# Patient Record
Sex: MALE | Race: WHITE
[De-identification: names, ages, dates, MRNs, and addresses within clinical notes are randomized per-mention and may not be internally consistent; named-entity substitution may affect disease eponyms.]

---

## 2020-10-24 ENCOUNTER — HOSPITAL ENCOUNTER (EMERGENCY)
Dept: HOSPITAL 62 - ER | Age: 50
Discharge: HOME | End: 2020-10-24
Payer: OTHER GOVERNMENT

## 2020-10-24 VITALS — DIASTOLIC BLOOD PRESSURE: 77 MMHG | SYSTOLIC BLOOD PRESSURE: 136 MMHG

## 2020-10-24 DIAGNOSIS — F17.200: ICD-10-CM

## 2020-10-24 DIAGNOSIS — R07.9: Primary | ICD-10-CM

## 2020-10-24 DIAGNOSIS — D72.829: ICD-10-CM

## 2020-10-24 DIAGNOSIS — R05: ICD-10-CM

## 2020-10-24 LAB
ADD MANUAL DIFF: NO
ALBUMIN SERPL-MCNC: 4.4 G/DL (ref 3.5–5)
ALP SERPL-CCNC: 78 U/L (ref 38–126)
ANION GAP SERPL CALC-SCNC: 10 MMOL/L (ref 5–19)
AST SERPL-CCNC: 24 U/L (ref 17–59)
BASOPHILS # BLD AUTO: 0.1 10^3/UL (ref 0–0.2)
BASOPHILS NFR BLD AUTO: 0.7 % (ref 0–2)
BILIRUB DIRECT SERPL-MCNC: 0.3 MG/DL (ref 0–0.4)
BILIRUB SERPL-MCNC: 0.5 MG/DL (ref 0.2–1.3)
BUN SERPL-MCNC: 12 MG/DL (ref 7–20)
CALCIUM: 10 MG/DL (ref 8.4–10.2)
CHLORIDE SERPL-SCNC: 102 MMOL/L (ref 98–107)
CO2 SERPL-SCNC: 28 MMOL/L (ref 22–30)
EOSINOPHIL # BLD AUTO: 0.2 10^3/UL (ref 0–0.6)
EOSINOPHIL NFR BLD AUTO: 1.7 % (ref 0–6)
ERYTHROCYTE [DISTWIDTH] IN BLOOD BY AUTOMATED COUNT: 13 % (ref 11.5–14)
GLUCOSE SERPL-MCNC: 125 MG/DL (ref 75–110)
HCT VFR BLD CALC: 45.2 % (ref 37.9–51)
HGB BLD-MCNC: 15.5 G/DL (ref 13.5–17)
LYMPHOCYTES # BLD AUTO: 2.6 10^3/UL (ref 0.5–4.7)
LYMPHOCYTES NFR BLD AUTO: 20.7 % (ref 13–45)
MCH RBC QN AUTO: 32.7 PG (ref 27–33.4)
MCHC RBC AUTO-ENTMCNC: 34.3 G/DL (ref 32–36)
MCV RBC AUTO: 95 FL (ref 80–97)
MONOCYTES # BLD AUTO: 0.7 10^3/UL (ref 0.1–1.4)
MONOCYTES NFR BLD AUTO: 5.7 % (ref 3–13)
NEUTROPHILS # BLD AUTO: 8.9 10^3/UL (ref 1.7–8.2)
NEUTS SEG NFR BLD AUTO: 71.2 % (ref 42–78)
NT PRO BNP: 13 PG/ML (ref ?–125)
PLATELET # BLD: 408 10^3/UL (ref 150–450)
POTASSIUM SERPL-SCNC: 4.4 MMOL/L (ref 3.6–5)
PROT SERPL-MCNC: 7.1 G/DL (ref 6.3–8.2)
RBC # BLD AUTO: 4.75 10^6/UL (ref 4.35–5.55)
TOTAL CELLS COUNTED % (AUTO): 100 %
TROPONIN I SERPL-MCNC: < 0.012 NG/ML
WBC # BLD AUTO: 12.5 10^3/UL (ref 4–10.5)

## 2020-10-24 PROCEDURE — 93005 ELECTROCARDIOGRAM TRACING: CPT

## 2020-10-24 PROCEDURE — 71046 X-RAY EXAM CHEST 2 VIEWS: CPT

## 2020-10-24 PROCEDURE — 85025 COMPLETE CBC W/AUTO DIFF WBC: CPT

## 2020-10-24 PROCEDURE — 99285 EMERGENCY DEPT VISIT HI MDM: CPT

## 2020-10-24 PROCEDURE — 84484 ASSAY OF TROPONIN QUANT: CPT

## 2020-10-24 PROCEDURE — 93010 ELECTROCARDIOGRAM REPORT: CPT

## 2020-10-24 PROCEDURE — 36415 COLL VENOUS BLD VENIPUNCTURE: CPT

## 2020-10-24 PROCEDURE — 85379 FIBRIN DEGRADATION QUANT: CPT

## 2020-10-24 PROCEDURE — 83735 ASSAY OF MAGNESIUM: CPT

## 2020-10-24 PROCEDURE — 80053 COMPREHEN METABOLIC PANEL: CPT

## 2020-10-24 PROCEDURE — 83880 ASSAY OF NATRIURETIC PEPTIDE: CPT

## 2020-10-24 NOTE — RADIOLOGY REPORT (SQ)
EXAM DESCRIPTION:  CHEST 2 VIEWS



IMAGES COMPLETED DATE/TIME:  10/24/2020 5:06 pm



REASON FOR STUDY:  Chest pain radiates to back



COMPARISON:  None.



TECHNIQUE:  Frontal and lateral radiographic views of the chest acquired.



NUMBER OF VIEWS:  Two view.



LIMITATIONS:  None.



FINDINGS:  LUNGS AND PLEURA: No opacities, masses or pneumothorax. No pleural effusion.

MEDIASTINUM AND HILAR STRUCTURES: No masses or contour abnormalities.

HEART AND VASCULAR STRUCTURES: Heart normal size.  No evidence for failure.

BONES: No acute findings.

HARDWARE: None in the chest.

OTHER: No other significant finding.



IMPRESSION:  NO SIGNIFICANT RADIOGRAPHIC FINDING IN THE CHEST.



TECHNICAL DOCUMENTATION:  JOB ID:  [id number]

 2011 [employer]- All Rights Reserved



Reading location - IP/workstation name: [email or web address]

## 2020-10-24 NOTE — ER DOCUMENT REPORT
ED General





- General


Chief Complaint: Chest Pain


Stated Complaint: CHEST PAIN


Time Seen by Provider: 10/24/20 16:52


Primary Care Provider: 


MCKINLEY ZHENG PA [NO LOCAL MD] - Follow up in 1 week


KENAN ALEMAN MD [ACTIVE PROVISIONAL STAFF] - Follow up in 3-5 days


Mode of Arrival: Ambulatory


Notes: 





49-year-old male history of smoking presents with approximately 8 hours 

intermittent mild to moderate chest discomfort that feels like sharp pain behind

sternum without radiation, associated symptoms, prior episodes, or aggravating 

or alleviating factors.  Pain started while patient was at rest, comes for few 

minutes and goes away and happens several times throughout the day.  Pain not 

exacerbated by exertion, not exacerbated by deep breathing.  Patient denies any 

prior episodes, trauma, shortness of breath, change in chronic dry cough times 

years attributed to smoking, recent illness, fever, hemoptysis, recent 

travel/trauma/surgery/immobilization, DVT/PE/hypercoagulability history in self 

or family, cancer history, unexplained weight loss, lower extremity edema or 

pain, cardiac history, other cardiac risk factors other than smoking, alcohol 

abuse, drug use


TRAVEL OUTSIDE OF THE [place] IN LAST 30 DAYS: No





- Related Data


Allergies/Adverse Reactions: 


                                        





No Known Allergies Allergy (Verified 10/24/20 16:56)


   











Past Medical History





- General


Information source: Patient





- Social History


Smoking Status: Current Every Day Smoker


Chew tobacco use (# tins/day): No


Frequency of alcohol use: Social


Drug Abuse: None


Family History: Reviewed & Not Pertinent


Patient has homicidal ideation: No





- Past Medical History


Cardiac Medical History: 


   Denies: Hx Coronary Artery Disease, Hx Heart Attack, Hx Hypertension


Pulmonary Medical History: 


   Denies: Hx Asthma, Hx Bronchitis, Hx COPD, Hx Pneumonia


Neurological Medical History: Denies: Hx Cerebrovascular Accident, Hx Seizures


Musculoskeletal Medical History: Denies Hx Arthritis





- Immunizations


Hx Diphtheria, Pertussis, Tetanus Vaccination: Yes - PT THINKS HE HAD PNEUMONIA 

SHOT IN 2008





Review of Systems





- Review of Systems


Notes: 





REVIEW OF SYSTEMS:


CONSTITUTIONAL :  Denies fever,  chills, or sweats. 


EENT: Denies recent cold/sinus symptoms, denies throat pain


CARDIOVASCULAR:  + chest pain, -NEELAM


RESPIRATORY:  Denies cough, denies shortness of breath.


GASTROINTESTINAL:  Denies abdominal pain, nausea/vomiting.


GENITOURINARY:  Denies difficulty urinating, painful urination.


MUSCULOSKELETAL:  Denies neck pain, back pain.


SKIN:   Denies rash or skin lesions.


HEMATOLOGIC :   Denies easy bruising or bleeding.


LYMPHATIC:  Denies swollen, enlarged glands.


NEUROLOGICAL:  Denies headache, denies change in gait.


PSYCHIATRIC:  Denies anxiety or stress or depression.





Physical Exam





- Vital signs


Vitals: 


                                        











Temp Pulse Resp BP Pulse Ox


 


 97.8 F   95   16   136/80 H  100 


 


 10/24/20 16:54  10/24/20 16:54  10/24/20 16:54  10/24/20 16:54  10/24/20 16:54














- Notes


Notes: 





PHYSICAL EXAMINATION:


 


GENERAL: Well-appearing, well-nourished, cheerful talkative middle-aged man 

appearing stated age chatting with myself and wife without any visible signs of 

discomfort and in no acute distress.


 


HEAD: Atraumatic, normocephalic.


 


EYES: Pupils equal round and appropriate constriction, sclera anicteric, c

onjunctiva are normal.


 


ENT: nares patent, moist mucous membranes.


 


NECK: Normal range of motion, supple without lymphadenopathy


 


LUNGS: Breath sounds clear to auscultation bilaterally and equal.  No wheezes 

rales or rhonchi.  Normal respiratory rate and effort


 


HEART: Regular rate and rhythm without murmurs, rubs, or gallops, normal chest 

inspection, no chest tenderness, no rash


 


ABDOMEN: Soft, nontender, no guarding, no masses, no CVAT


 


EXTREMITIES: Normal range of motion, no pitting or edema.  No cyanosis.


 


NEUROLOGICAL: Awake, alert, conversing appropriately, moves all extremities s

pontaneously.


 


PSYCH: Normal mood, normal affect.


 


SKIN: Warm, Dry, normal turgor, no rashes or lesions noted.





Course





- Re-evaluation


Re-evalutation: 





10/24/20 21:50


Patient with 1 day chest pain, atypical for ACS but given the age and risk 

factors will rule out with adapt score.  Patient low risk on DAPT score so 

obtaining 0 and 2-hour troponins for ACS rule out.  No signs of ischemia on EKG.

given patient's age unable to rule out PE with PERC so obtain D-dimer which is 

appropriate for patient as Wells criteria are low.  Patient will require close 

outpatient follow-up with PCP and cardiologist which I discussed with patient 

and wife at length.  [facility] follows [facility] and will be making appointment to see

PCP and cardiologist.  Patient's presentation not consistent with aortic 

dissection, no Covid symptoms, no indication for emergent cardiology inpatient 

evaluation, heart score 2.


10/24/20 23:00


Repeat troponin remains negative, patient remains asymptomatic in ED, Patient 

ready for discharge with PCP and cardiology follow-up.  Given extensive return 

to ED precautions which patient and wife demonstrate understanding of.  Patient 

currently on monitor and respiratory rate 16, on multiple evaluations patient 

has had normal respiratory rate and has no respiratory symptoms.  Vital signs 

include few measurements of tachypnea which does not match patient presentation 

and was likely erroneous reading.  Patient's respiratory status normal 

throughout ED evaluation.





- Vital Signs


Vital signs: 


                                        











Temp Pulse Resp BP Pulse Ox


 


 97.8 F   95   16   136/77 H  96 


 


 10/24/20 16:57  10/24/20 16:54  10/24/20 23:01  10/24/20 23:01  10/24/20 23:01














- Laboratory


Result Diagrams: 


                                 10/24/20 17:18





                                 10/24/20 17:18


Laboratory results interpreted by me: 


                                        











  10/24/20 10/24/20





  17:18 17:18


 


WBC  12.5 H 


 


Absolute Neuts (auto)  8.9 H 


 


Glucose   125 H














- EKG Interpretation by Me


Additional EKG results interpreted by me: 





10/24/20 21:52


Heart rate 90, sinus rhythm, no significant ST elevations or depressions, no 

significant T wave abnormalities, QTc 426





Discharge





- Discharge


Clinical Impression: 


Chest pain


Qualifiers:


 Chest pain type: unspecified Qualified Code(s): R07.9 - Chest pain, unspecified





Leukocytosis


Qualifiers:


 Leukocytosis type: unspecified Qualified Code(s): D72.829 - Elevated white 

blood cell count, unspecified





Disposition: HOME, SELF-CARE


Additional Instructions: 


Chest Pain of Unclear Cause





   The exact cause of your chest pain isn't clear. Fortunately, there is no 

evidence of a dangerous medical condition.  Further testing may be required to 

find the source of the pain.


   Most often, we find that this pain is coming from the chest wall -- the 

muscles or rib joints in the chest.  But chest pain can come from the lung and 

lung lining, the esophagus, the heart valves or heart lining, and even the 

stomach or gallbladder.


   Rest.  Eat lightly until the pain is gone.  We may prescribe medicine for 

pain and inflammation.


   You should call the physician immediately if the pain radiates to the shoul

morena, jaw or arms; if you start to run a fever or develop a cough; or if you 

develop shortness of breath, or other new or alarming symptoms.





You need to follow-up with your primary doctor and the cardiologist within 1 

week to further investigate your chest pain.  If you have any worsening pain, 

trouble breathing, dizziness, fainting, leg swelling, or any other worsening or 

alarming symptoms return to the emergency department immediately.  Your white 

blood cells were high on this visit, you need to follow this up with your 

primary doctor.  It may be due to a benign condition, but if it does not go away

it is possible that this could be secondary to a dangerous condition such as 

cancer.  Your blood pressure was also high (145/100) during this ED visit, 

untreated blood pressure is a risk factor for heart attack, stroke, and death, 

he should have it repeated with your primary doctor.


Referrals: 


KENAN ALEMAN MD [ACTIVE PROVISIONAL STAFF] - Follow up in 3-5 days


MCKINLEY ZHENG PA [NO LOCAL MD] - Follow up in 1 week

## 2020-10-24 NOTE — ER DOCUMENT REPORT
ED Medical Screen (RME)





- General


Chief Complaint: Chest Pain > 30


Stated Complaint: CHEST PAIN


Time Seen by Provider: 10/24/20 16:52


Primary Care Provider: 


MCKINLEY ZHENG PA [Primary Care Provider] - Follow up as needed


Mode of Arrival: Ambulatory


Information source: Patient


Notes: 





49-year-old male presented ED for complaint of chest pain since this morning.  

He states it has been intermittent since then.  States it is a level 3 out of 5 

pain.  He states the pain is a dull ache.  Denies any shortness of breath.  He 

states it does radiate to his back.  He states is between his shoulder blades in

the back.  He denies any cardiac history.  He states his mother did have cardiac

problems but they started in her 60s.  We will get blood chest x-ray EKG and 

have patient seen by a provider.  States he smokes 1/2 pack a day, drinks a 

sixpack on the weekend, denies any illicit drugs.

















I have greeted and performed a rapid initial assessment of this patient.  A 

comprehensive ED assessment and evaluation of the patient, analysis of test 

results and completion of medical decision making process will be conducted by 

an additional ED providers.


TRAVEL OUTSIDE OF THE [place] IN LAST 30 DAYS: No





- Related Data


Allergies/Adverse Reactions: 


                                        





No Known Allergies Allergy (Verified 10/24/20 16:56)


   











Past Medical History





- Past Medical History


Cardiac Medical History: 


   Denies: Hx Coronary Artery Disease, Hx Heart Attack, Hx Hypertension


Pulmonary Medical History: 


   Denies: Hx Asthma, Hx Bronchitis, Hx COPD, Hx Pneumonia


Neurological Medical History: Denies: Hx Cerebrovascular Accident, Hx Seizures


Musculoskeltal Medical History: Denies Hx Arthritis





- Immunizations


Hx Diphtheria, Pertussis, Tetanus Vaccination: Yes - PT THINKS HE HAD PNEUMONIA 

SHOT IN 2008





Physical Exam





- Vital signs


Vitals: 


                                        











Temp Pulse Resp BP Pulse Ox


 


 97.8 F   95   16   136/80 H  100 


 


 10/24/20 16:54  10/24/20 16:54  10/24/20 16:54  10/24/20 16:54  10/24/20 16:54














Course





- Vital Signs


Vital signs: 


                                        











Temp Pulse Resp BP Pulse Ox


 


 97.8 F   95   16   136/80 H  100 


 


 10/24/20 16:54  10/24/20 16:54  10/24/20 16:54  10/24/20 16:54  10/24/20 16:54














Doctor's Discharge





- Discharge


Referrals: 


MCKINLEY ZHENG PA [Primary Care Provider] - Follow up as needed